# Patient Record
Sex: MALE | Race: ASIAN | NOT HISPANIC OR LATINO | URBAN - METROPOLITAN AREA
[De-identification: names, ages, dates, MRNs, and addresses within clinical notes are randomized per-mention and may not be internally consistent; named-entity substitution may affect disease eponyms.]

---

## 2022-12-12 ENCOUNTER — EMERGENCY (EMERGENCY)
Facility: HOSPITAL | Age: 62
LOS: 1 days | Discharge: HOME | End: 2022-12-12
Attending: EMERGENCY MEDICINE
Payer: COMMERCIAL

## 2022-12-12 VITALS
OXYGEN SATURATION: 97 % | HEART RATE: 71 BPM | SYSTOLIC BLOOD PRESSURE: 110 MMHG | DIASTOLIC BLOOD PRESSURE: 76 MMHG | RESPIRATION RATE: 18 BRPM | TEMPERATURE: 98 F

## 2022-12-12 VITALS
HEART RATE: 98 BPM | DIASTOLIC BLOOD PRESSURE: 84 MMHG | TEMPERATURE: 98 F | OXYGEN SATURATION: 97 % | SYSTOLIC BLOOD PRESSURE: 154 MMHG | WEIGHT: 188.94 LBS | RESPIRATION RATE: 20 BRPM | HEIGHT: 66.93 IN

## 2022-12-12 LAB
ALBUMIN SERPL ELPH-MCNC: 4.5 G/DL — SIGNIFICANT CHANGE UP (ref 3.3–5)
ALP SERPL-CCNC: 60 U/L — SIGNIFICANT CHANGE UP (ref 40–120)
ALT FLD-CCNC: 39 U/L — SIGNIFICANT CHANGE UP (ref 10–45)
ANION GAP SERPL CALC-SCNC: 14 MMOL/L — SIGNIFICANT CHANGE UP (ref 5–17)
AST SERPL-CCNC: 17 U/L — SIGNIFICANT CHANGE UP (ref 10–40)
BASOPHILS # BLD AUTO: 0.06 K/UL — SIGNIFICANT CHANGE UP (ref 0–0.2)
BASOPHILS NFR BLD AUTO: 0.7 % — SIGNIFICANT CHANGE UP (ref 0–2)
BILIRUB SERPL-MCNC: 0.3 MG/DL — SIGNIFICANT CHANGE UP (ref 0.2–1.2)
BUN SERPL-MCNC: 19 MG/DL — SIGNIFICANT CHANGE UP (ref 7–23)
CALCIUM SERPL-MCNC: 9.5 MG/DL — SIGNIFICANT CHANGE UP (ref 8.4–10.5)
CHLORIDE SERPL-SCNC: 104 MMOL/L — SIGNIFICANT CHANGE UP (ref 96–108)
CO2 SERPL-SCNC: 20 MMOL/L — LOW (ref 22–31)
CREAT SERPL-MCNC: 0.72 MG/DL — SIGNIFICANT CHANGE UP (ref 0.5–1.3)
EGFR: 103 ML/MIN/1.73M2 — SIGNIFICANT CHANGE UP
EOSINOPHIL # BLD AUTO: 0.47 K/UL — SIGNIFICANT CHANGE UP (ref 0–0.5)
EOSINOPHIL NFR BLD AUTO: 5.3 % — SIGNIFICANT CHANGE UP (ref 0–6)
GLUCOSE SERPL-MCNC: 117 MG/DL — HIGH (ref 70–99)
HCT VFR BLD CALC: 46.6 % — SIGNIFICANT CHANGE UP (ref 39–50)
HGB BLD-MCNC: 15.8 G/DL — SIGNIFICANT CHANGE UP (ref 13–17)
IMM GRANULOCYTES NFR BLD AUTO: 0.5 % — SIGNIFICANT CHANGE UP (ref 0–0.9)
LYMPHOCYTES # BLD AUTO: 2.03 K/UL — SIGNIFICANT CHANGE UP (ref 1–3.3)
LYMPHOCYTES # BLD AUTO: 22.9 % — SIGNIFICANT CHANGE UP (ref 13–44)
MCHC RBC-ENTMCNC: 32.4 PG — SIGNIFICANT CHANGE UP (ref 27–34)
MCHC RBC-ENTMCNC: 33.9 GM/DL — SIGNIFICANT CHANGE UP (ref 32–36)
MCV RBC AUTO: 95.7 FL — SIGNIFICANT CHANGE UP (ref 80–100)
MONOCYTES # BLD AUTO: 0.84 K/UL — SIGNIFICANT CHANGE UP (ref 0–0.9)
MONOCYTES NFR BLD AUTO: 9.5 % — SIGNIFICANT CHANGE UP (ref 2–14)
NEUTROPHILS # BLD AUTO: 5.44 K/UL — SIGNIFICANT CHANGE UP (ref 1.8–7.4)
NEUTROPHILS NFR BLD AUTO: 61.1 % — SIGNIFICANT CHANGE UP (ref 43–77)
NRBC # BLD: 0 /100 WBCS — SIGNIFICANT CHANGE UP (ref 0–0)
PLATELET # BLD AUTO: 193 K/UL — SIGNIFICANT CHANGE UP (ref 150–400)
POTASSIUM SERPL-MCNC: 4 MMOL/L — SIGNIFICANT CHANGE UP (ref 3.5–5.3)
POTASSIUM SERPL-SCNC: 4 MMOL/L — SIGNIFICANT CHANGE UP (ref 3.5–5.3)
PROT SERPL-MCNC: 6.8 G/DL — SIGNIFICANT CHANGE UP (ref 6–8.3)
RAPID RVP RESULT: SIGNIFICANT CHANGE UP
RBC # BLD: 4.87 M/UL — SIGNIFICANT CHANGE UP (ref 4.2–5.8)
RBC # FLD: 11.9 % — SIGNIFICANT CHANGE UP (ref 10.3–14.5)
SARS-COV-2 RNA SPEC QL NAA+PROBE: SIGNIFICANT CHANGE UP
SODIUM SERPL-SCNC: 138 MMOL/L — SIGNIFICANT CHANGE UP (ref 135–145)
TROPONIN T, HIGH SENSITIVITY RESULT: 8 NG/L — SIGNIFICANT CHANGE UP (ref 0–51)
WBC # BLD: 8.88 K/UL — SIGNIFICANT CHANGE UP (ref 3.8–10.5)
WBC # FLD AUTO: 8.88 K/UL — SIGNIFICANT CHANGE UP (ref 3.8–10.5)

## 2022-12-12 PROCEDURE — 36415 COLL VENOUS BLD VENIPUNCTURE: CPT

## 2022-12-12 PROCEDURE — 99285 EMERGENCY DEPT VISIT HI MDM: CPT

## 2022-12-12 PROCEDURE — 84484 ASSAY OF TROPONIN QUANT: CPT

## 2022-12-12 PROCEDURE — 71046 X-RAY EXAM CHEST 2 VIEWS: CPT

## 2022-12-12 PROCEDURE — 93005 ELECTROCARDIOGRAM TRACING: CPT

## 2022-12-12 PROCEDURE — 80053 COMPREHEN METABOLIC PANEL: CPT

## 2022-12-12 PROCEDURE — 85025 COMPLETE CBC W/AUTO DIFF WBC: CPT

## 2022-12-12 PROCEDURE — 0225U NFCT DS DNA&RNA 21 SARSCOV2: CPT

## 2022-12-12 PROCEDURE — 99285 EMERGENCY DEPT VISIT HI MDM: CPT | Mod: 25

## 2022-12-12 PROCEDURE — 94640 AIRWAY INHALATION TREATMENT: CPT

## 2022-12-12 PROCEDURE — 71046 X-RAY EXAM CHEST 2 VIEWS: CPT | Mod: 26

## 2022-12-12 RX ORDER — ALBUTEROL 90 UG/1
4 AEROSOL, METERED ORAL ONCE
Refills: 0 | Status: COMPLETED | OUTPATIENT
Start: 2022-12-12 | End: 2022-12-12

## 2022-12-12 RX ORDER — ALBUTEROL 90 UG/1
8 AEROSOL, METERED ORAL ONCE
Refills: 0 | Status: DISCONTINUED | OUTPATIENT
Start: 2022-12-12 | End: 2022-12-12

## 2022-12-12 RX ADMIN — ALBUTEROL 4 PUFF(S): 90 AEROSOL, METERED ORAL at 05:27

## 2022-12-12 NOTE — ED PROVIDER NOTE - NSFOLLOWUPINSTRUCTIONS_ED_ALL_ED_FT
You were seen in the ER for cough. Your lab results were within normal limits. Your CT scan showed ***.    Please follow up with your primary care doctor.    Please return to the ER if you have worsening symptoms including fever, chest pain, shortness of breath, abdominal pain, nausea, vomiting, diarrhea, weakness or lightheadedness/fainting. You were seen in the ER for cough. Your lab results were within normal limits. Your Xray was normal. You can take tylenol and/or motrin for your pain. Please follow the instructions on the packaging. You can use the albuterol inhaler if you have more symptoms.    Please follow up with your primary care doctor.    Please return to the ER if you have worsening symptoms including fever, chest pain, shortness of breath, abdominal pain, nausea, vomiting, diarrhea, weakness or lightheadedness/fainting.

## 2022-12-12 NOTE — ED PROVIDER NOTE - PHYSICAL EXAMINATION
General appearance: NAD, conversant, afebrile    Eyes: anicteric sclerae, CURLY, EOMI   HENT: Atraumatic; oropharynx clear, MMM and no ulcerations, no pharyngeal erythema or exudate   Neck: Trachea midline; Full range of motion, supple   Pulm: CTA bl, normal respiratory effort and no intercostal retractions, normal work of breathing   CV: RRR, No murmurs, rubs, or gallops.    Abdomen: Soft, non-tender, non-distended; no guarding or rebound   Extremities: No peripheral edema or extremity lymphadenopathy.    Skin: Dry, normal temperature, turgor and texture; no rash, ulcers or subcutaneous nodules   Psych: Appropriate affect, cooperative; alert and oriented to person, place and time

## 2022-12-12 NOTE — ED PROVIDER NOTE - CLINICAL SUMMARY MEDICAL DECISION MAKING FREE TEXT BOX
62-year-old male with history of CAD with 1 stent, current day smoker presenting with cough.  Patient started having symptoms of cough, congestion, fever, diarrhea 11/29, tested positive for COVID 12/2.  Symptoms of cough have persisted, causing chest pain and shortness of breath when he has coughing fits.  Tested negative for COVID at home today.  Patient is COVID vaccinated and flu vaccinated. Exam shows stable VS and clear lungs, +cough. Likely persistent cough from covid infection. Pt still smoking. Will get labs, CXR, advise to stop smoking.

## 2022-12-12 NOTE — ED ADULT NURSE NOTE - PAIN RATING/NUMBER SCALE (0-10): REST
----- Message from Brit Giang sent at 2/20/2017  9:26 AM EST -----  Concerning pts Ct scan--this went into clinical review with his insurance and they requested peer to peer by calling [phone]  How would you like to proceed with this referral?   4

## 2022-12-12 NOTE — ED PROVIDER NOTE - PATIENT PORTAL LINK FT
You can access the FollowMyHealth Patient Portal offered by [facility] by registering at the following website: [email or web address]. By joining [employer]’s FollowMyHealth portal, you will also be able to view your health information using other applications (apps) compatible with our system.

## 2022-12-12 NOTE — ED PROVIDER NOTE - OBJECTIVE STATEMENT
62-year-old male with history of CAD with 1 stent, current day smoker presenting with cough.  Patient started having symptoms of cough, congestion, fever, diarrhea 11/29, tested positive for COVID 12/2.  Symptoms of cough have persisted, causing chest pain and shortness of breath when he has coughing fits.  Tested negative for COVID at home today.  Patient is COVID vaccinated and flu vaccinated.  No abdominal pain, nausea, vomiting.  patient supposed to get spinal surgery for stenosis last week however was COVID-positive.  Patient has pain radiating down the right leg which is chronic and stable for him. No saddle anesthesia or incontinence.

## 2022-12-12 NOTE — ED PROVIDER NOTE - ATTENDING CONTRIBUTION TO CARE
Attending MD Perry:  I personally have seen and examined this patient. I have performed a substantive portion of the visit including all aspects of the medical decision making.  Resident note reviewed and agree on plan of care and except where noted.      62-year-old gentleman with a history of coronary artery disease, daily smoker presenting for evaluation of ongoing cough since 11/29.  Recent diagnosis of COVID-19.  He has pain when he is coughing.  There is some shortness of breath with coughing as well.    Vital signs within normal limits.  Patient sitting up in the stretcher in no apparent distress.  There is no increased work of breathing.  Lungs are clear posteriorly.  Abdomen soft nontender nondistended.  Extremities warm and well-perfused without appreciable edema.    Suspect likely ongoing post COVID-19 cough.  No clinical evidence to suggest superimposed bacterial pneumonia.  Will obtain chest x-ray, screening EKG blood work and troponin to rule out myocarditis or electrolyte derangements.  Trial bronchodilators for symptomatic relief of cough.        *The above represents an initial assessment/impression. Please refer to progress notes for potential changes in patient clinical course*

## 2022-12-12 NOTE — ED ADULT NURSE NOTE - OBJECTIVE STATEMENT
patient is a 61 y/o M, covid positive x3 weeks who presents to the ED c/o persistent cough. patient states that the cough is worse when lying flat, and causes him to have "aching" chest pain when he coughs. patient denies fevers, chest pain at rest at this time. current everyday smoker.  patient resting in NAD. respirations even and unlabored with symmetrical chest rise. abdomen soft and nondistended. a&ox4, ambulatory independently. MD Tilley at bedside to assess

## 2023-07-15 ENCOUNTER — EMERGENCY (EMERGENCY)
Facility: HOSPITAL | Age: 63
LOS: 1 days | Discharge: HOME | End: 2023-07-15
Attending: STUDENT IN AN ORGANIZED HEALTH CARE EDUCATION/TRAINING PROGRAM
Payer: COMMERCIAL

## 2023-07-15 VITALS
WEIGHT: 179.9 LBS | OXYGEN SATURATION: 95 % | TEMPERATURE: 98 F | RESPIRATION RATE: 16 BRPM | HEART RATE: 86 BPM | DIASTOLIC BLOOD PRESSURE: 89 MMHG | SYSTOLIC BLOOD PRESSURE: 144 MMHG

## 2023-07-15 VITALS
DIASTOLIC BLOOD PRESSURE: 67 MMHG | HEART RATE: 72 BPM | OXYGEN SATURATION: 98 % | TEMPERATURE: 98 F | SYSTOLIC BLOOD PRESSURE: 115 MMHG | RESPIRATION RATE: 18 BRPM

## 2023-07-15 PROCEDURE — 99284 EMERGENCY DEPT VISIT MOD MDM: CPT | Mod: 25

## 2023-07-15 PROCEDURE — 99284 EMERGENCY DEPT VISIT MOD MDM: CPT

## 2023-07-15 PROCEDURE — 72131 CT LUMBAR SPINE W/O DYE: CPT | Mod: MA

## 2023-07-15 PROCEDURE — 72131 CT LUMBAR SPINE W/O DYE: CPT | Mod: 26,MA

## 2023-07-15 PROCEDURE — 99053 MED SERV 10PM-8AM 24 HR FAC: CPT

## 2023-07-15 RX ORDER — IBUPROFEN 200 MG
400 TABLET ORAL ONCE
Refills: 0 | Status: COMPLETED | OUTPATIENT
Start: 2023-07-15 | End: 2023-07-15

## 2023-07-15 RX ORDER — METHOCARBAMOL 500 MG/1
1500 TABLET, FILM COATED ORAL ONCE
Refills: 0 | Status: COMPLETED | OUTPATIENT
Start: 2023-07-15 | End: 2023-07-15

## 2023-07-15 RX ORDER — ACETAMINOPHEN 500 MG
975 TABLET ORAL ONCE
Refills: 0 | Status: COMPLETED | OUTPATIENT
Start: 2023-07-15 | End: 2023-07-15

## 2023-07-15 RX ORDER — METHOCARBAMOL 500 MG/1
750 TABLET, FILM COATED ORAL ONCE
Refills: 0 | Status: COMPLETED | OUTPATIENT
Start: 2023-07-15 | End: 2023-07-15

## 2023-07-15 RX ORDER — LIDOCAINE 4 G/100G
1 CREAM TOPICAL ONCE
Refills: 0 | Status: COMPLETED | OUTPATIENT
Start: 2023-07-15 | End: 2023-07-15

## 2023-07-15 RX ORDER — LIDOCAINE 4 G/100G
1 CREAM TOPICAL
Qty: 2 | Refills: 0
Start: 2023-07-15 | End: 2023-07-24

## 2023-07-15 RX ORDER — METHOCARBAMOL 500 MG/1
2 TABLET, FILM COATED ORAL
Qty: 60 | Refills: 0
Start: 2023-07-15 | End: 2023-07-24

## 2023-07-15 RX ADMIN — Medication 975 MILLIGRAM(S): at 05:59

## 2023-07-15 RX ADMIN — Medication 400 MILLIGRAM(S): at 05:59

## 2023-07-15 RX ADMIN — METHOCARBAMOL 1500 MILLIGRAM(S): 500 TABLET, FILM COATED ORAL at 10:21

## 2023-07-15 RX ADMIN — LIDOCAINE 1 PATCH: 4 CREAM TOPICAL at 06:01

## 2023-07-15 RX ADMIN — METHOCARBAMOL 750 MILLIGRAM(S): 500 TABLET, FILM COATED ORAL at 05:59
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
## 2023-07-15 NOTE — ED PROVIDER NOTE - PHYSICAL EXAMINATION
GENERAL: Awake, alert, NAD  HEENT: NC/AT, moist mucous membranes, PERRL, EOMI  LUNGS: CTAB, no wheezes or crackles   CARDIAC: RRR, no m/r/g  ABDOMEN: Soft, non tender, non distended, no rebound, no guarding  BACK: Sacral midline spinal tenderness, right lumbosacral paraspinal tenderness.  Positive leg raise right> left.  EXT: No edema, no calf tenderness, 2+ DP pulses bilaterally, no deformities.  NEURO: A&Ox3. Moving all extremities.  Ranging all extremities.  SKIN: Warm and dry. No rash.  PSYCH: Normal affect.
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
## 2023-07-15 NOTE — ED PROVIDER NOTE - PROGRESS NOTE DETAILS
Dr. Mayorga Note: s/o from night team, pending ct lumbar, pt assessed, pain improved but now worsening again, will re-dose muscle relaxant. Schuyler Abreu, PGY2 - signed out from the previous team pending CT read. No acute fracture/dislocation at this time on the CT read. Patient reassessed. Pain is about the same however able to ambulate. Gave instruction to f/u with spine care.

## 2023-07-15 NOTE — ED ADULT NURSE REASSESSMENT NOTE - NS ED NURSE REASSESS COMMENT FT1
Received report from previous shift RN. Patient resting in bed in hallway, reporting improved back pain. Patient denies numbness/tingling/weakness. Patient and family aware of plan of care for CT. VSS

## 2023-07-15 NOTE — ED PROVIDER NOTE - NSFOLLOWUPINSTRUCTIONS_ED_ALL_ED_FT
No signs of emergency medical condition on today's workup.  Presumptive diagnosis made, but further evaluation may be required by your primary care doctor or specialist for a definitive diagnosis.  Therefore, follow up as directed and if symptoms change/worsen or any emergency conditions, please return to the ER.    YOU WERE SEEN FOR low back pain    YOU HAD imaging done. These results are included in your discharge paperwork.   You can take ibuprofen 400mg every 6 to 8 hours and Tylenol 1000mg every 6 to 8 hours as needed for pain.     FOLLOW UP WITH a spine care doctor     RETURN TO THE EMERGENCY DEPARTMENT FOR worsening back pain, losing bowel/bladder movement, fever/chills, loss of groin sensation or any new/concerning symptoms.

## 2023-07-15 NOTE — ED PROVIDER NOTE - PATIENT PORTAL LINK FT
You can access the FollowMyHealth Patient Portal offered by [facility] by registering at the following website: [email or web address]. By joining [employer]’s FollowMyHealth portal, you will also be able to view your health information using other applications (apps) compatible with our system.
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
## 2025-02-12 PROBLEM — I10 ESSENTIAL (PRIMARY) HYPERTENSION: Chronic | Status: ACTIVE | Noted: 2023-07-15

## 2025-03-17 NOTE — ED ADULT NURSE NOTE - NSFALLRSKASSESSDT_ED_ALL_ED
Cardiology Clinic Note: Andrei Avery DO     Primary care physician: Praneeth Duenas MD     Patient: Salbador Chowdhury  : 1957    Chief Complaint   Patient presents with    Office Visit     Denies CP,SOB and dizziness    Follow-up       History of Present Illness:     Salbador Chowdhury is a 67 year old male who presented today for a follow-up accompanied by his wife.  Patient is followed here for:   - CAD s/p CABG in , s/p KAREN x4 in RCA in ; s/p three stents placed in posterior lateral, distal and mid of RCA (2024).   - ICM  - HTN  - HLD  - KRISTIAN on CPAP       Since the patient's last visit with me, he is now s/p ICD implantation 25. The patient had a Device alert 3/5/25: alert for VT on 3/3 at 12:35 PM at 234 bpm lasting 21 seconds with successful ATPx1.     Otherwise, Regarding activity, the patient walks about 2.5 miles a day in addition to completing ROM cardiac rehab three times weekly. He reports that ROM cardiac rehab has been going well for him.    Currently He denies any chest pain, shortness of breath, or palpitations. Denies any orthopnea, PND, or peripheral edema. Denies any dizziness, lightheadedness, or syncope. He denies any bleeding complications such as unusual bruising, epistaxis, hematuria, hematochezia, melena. He denies any muscle aches or pains. The patient has been tolerating their medication regimen.    Patient's wife reports that they eat well balanced meals however the main concern regarding diet is the portion control.      Review of Systems   Constitutional: Negative.   HENT: Negative.     Eyes: Negative.    Cardiovascular:  Negative for chest pain, dyspnea on exertion, leg swelling, near-syncope, orthopnea, palpitations and syncope.   Respiratory: Negative.     Endocrine: Negative.    Hematologic/Lymphatic: Negative.    Skin: Negative.    Musculoskeletal: Negative.    Gastrointestinal: Negative.    Genitourinary: Negative.    Neurological: Negative.     Psychiatric/Behavioral: Negative.     Allergic/Immunologic: Negative.    All other systems reviewed and are negative.      Past Medical History:   Diagnosis Date    AAA (abdominal aortic aneurysm) (CMD)     Bronchitis     CAD (coronary artery disease)     HLD (hyperlipidemia)     HTN (hypertension)     Hx of CABG 2015    Ischemic cardiomyopathy     Left shoulder pain     Myocardial infarction  (CMD)     2015    Neck pain     KRISTIAN on CPAP     S/P right coronary artery (RCA) stent placement 2017    KAREN x4    Sore throat     Viral pharyngitis     VT (ventricular tachycardia)  (CMD)      Past Surgical History:   Procedure Laterality Date    Colonoscopy      Coronary angioplasty with stent placement  11/26/2024    RCA x 2, right PL branch    Coronary artery bypass graft  2015    x 3    Coronary stent placement  2016    RCA     ALLERGIES:   Allergen Reactions    Penicillins Other (See Comments)     Unknown     Social History     Tobacco Use    Smoking status: Former    Smokeless tobacco: Never   Vaping Use    Vaping status: never used   Substance Use Topics    Alcohol use: Yes     Comment: socially    Drug use: No      Family History   Problem Relation Age of Onset    Coronary Artery Disease Mother      Current Outpatient Medications   Medication Sig Dispense Refill    torsemide (DEMADEX) 10 MG tablet Take 0.5 tablets by mouth daily. 90 tablet 3    losartan (COZAAR) 25 MG tablet Take 1 tablet by mouth daily. 90 tablet 3    verapamil (CALAN SR) 240 MG CR tablet TAKE 1 TABLET BY MOUTH DAILY 90 tablet 3    metoPROLOL succinate (TOPROL-XL) 25 MG 24 hr tablet Take 1 tablet by mouth daily. 90 tablet 3    clopidogrel (PLAVIX) 75 MG tablet Take 1 tablet by mouth daily. 90 tablet 3    rosuvastatin (CRESTOR) 20 MG tablet Take 0.5 tablets by mouth at bedtime. 45 tablet 1    aspirin 81 MG tablet Take 81 mg by mouth daily.       Multiple Vitamin (MULTI-VITAMIN) tablet Take 1 tablet by mouth daily.        No current  facility-administered medications for this visit.          Vitals:    03/18/25 0955 03/18/25 1000   BP: 127/77 121/72   BP Location: LUE - Left upper extremity LUE - Left upper extremity   Patient Position: Sitting Standing   Cuff Size: Regular Large Adult   Pulse: 68    Temp: 97.8 °F (36.6 °C)    TempSrc: Temporal    SpO2: 96%    Weight: 115.3 kg (254 lb 3.1 oz)    Height: 5' 11\" (1.803 m)            Wt Readings from Last 4 Encounters:   03/18/25 115.3 kg (254 lb 3.1 oz)   01/27/25 112.9 kg (249 lb)   01/10/25 116.4 kg (256 lb 9.9 oz)   12/17/24 116.2 kg (256 lb 1 oz)       Physical Exam   Constitutional: He appears healthy. No distress.   Neck: No JVD present. No thyromegaly present.   Cardiovascular: Normal rate, regular rhythm, S1 normal, S2 normal, normal heart sounds, intact distal pulses and normal pulses. Exam reveals no S3 and no S4.   Pulmonary/Chest: Breath sounds normal. He has no wheezes. He has no rales. He exhibits no tenderness.   Abdominal: Soft. He exhibits no distension and no mass. There is no abdominal tenderness.   Musculoskeletal:         General: No edema. Normal range of motion.   Neurological: He is alert and oriented to person, place, and time.   Skin: Skin is warm and dry.        Labs:    Lipid Panel 11/15/2023:    Cholesterol  <=199 mg/dL 105 102 CM 95 106 R, CM 99 R,  R,  R, CM    Comment:  Desirable         <200  Borderline High   200 to 239  High              >=240   Triglycerides  <=149 mg/dL 95 99  112 R, CM 80 R,  R, CM 88 R, CM    Comment:  Normal            <150  Borderline High   150 to 199  High              200 to 499  Very High         >=500   HDL  >=40 mg/dL 41 41 CM 41 39 Low  R, CM 39 Low  R, CM 38 Low  R, CM 36 Low  R, CM    Comment:  Low              <40  Borderline Low   40 to 49  Near Optimal     50 to 59  Optimal          >=60   LDL  <=129 mg/dL 45 41 CM 31 CM 45 R, CM 44 R, CM 39 R, CM 47 R, CM 47 R, CM   Comment:  Optimal           <100  Near  Optimal      100 to 129  Borderline High   130 to 159  High              160 to 189  Very High         >=190       Recent Labs   Lab 01/22/25  0929 12/09/24  1014 11/12/24  0901 09/30/24  0946   Sodium 136 138  --  144   Chloride 103 103  --  112*   BUN 21* 22*  --  17   Potassium 4.6 4.0 4.6 5.4*   Glucose 98 87  --  101*   Creatinine 1.00 1.05  --  0.90   Calcium 9.2 8.8  --  9.1   TSH  --   --   --  1.236       Recent Labs   Lab 01/22/25  0929   WBC 7.6   RBC 5.75   HGB 14.4   HCT 44.3   MCV 77.0*   MCHC 32.5   RDW-CV 19.0*      Lymphocytes, Percent 24         Recent Labs   Lab 12/09/24  1014 09/30/24  0946   GPT 18 22      Imaging:    Cardiac cath 12/21/2015:  CONCLUSION:   1. Left main coronary artery with 80% eccentric ulcerated lesion in the distal       left main.   2. Left anterior descending artery with mild diffuse disease without discrete       lesions.   3. Ramus intermedius artery which bifurcates into 2 vessels with mild disease,       probably 50% in the proximal branching point.   4. Left circumflex artery with no significant disease.   5. Right coronary artery with significant diffuse disease throughout the course,       99% stenosis proximal RCA at the takeoff of the 1st acute marginal, followed by       90% at the distal RCA at the takeoff of PDA and posterolateral branch.   6. Left ventricular end-diastolic pressure approximately 10 mmHg.   7. Successful hemostasis achieved with radial Vasc band patent hemostasis device.      RECOMMENDATIONS:   After careful review of angiographic findings, the decision was made to refer the   patient for coronary artery bypass surgery.  The patient will be monitored in the                                Cardiac Cath - 2   intensive care unit.  Heparin drip with bolus will be started, nitro drip will be   started, Plavix will be on hold, and aspirin and beta-blockers will be continued.      TTE 12/3/17:  STUDY CONCLUSIONS  SUMMARY:  1. Left ventricle:  There is basal inferior hypokinesis. The cavity size is     dilated. Wall thickness is normal. Systolic function is normal. The     estimated ejection fraction is 55-60%. Left ventricular diastolic     function parameters are normal.  2. Left atrium: The atrium is moderately dilated.  3. Right atrium: The atrium is moderately dilated.  4. Mitral valve: Trivial regurgitation.  5. Tricuspid valve: Trivial regurgitation.  6. Right ventricle: The estimated peak pressure is 39mm Hg.  7. Pulmonic valve: Mild regurgitation.     Cardiac Cath 12/8/17:  CONCLUSIONS:  Complex intervention of a heavily calcified RCA with a critical posterolateral  stenosis, critical distal RCA, mid RCA stenosis with calcification, and moderate  atherosclerosis of the proximal RCA.  Four stents in total placed all drug-eluting.  RUTH ANN-3 flow restored to the distal right and posterolateral system.  There are  widely patent vein graft to the PDA, LAD, and ramus intermedius.  Next, the patient  will be strongly encouraged participating in cardiac rehab.  Perclose was used to  seal the femoral arteriotomy.     Stress Test 10/15/18:  FINAL IMPRESSION:  There were no symptoms consistent with angina pectoris.   There were no electrocardiographic changes diagnostic of myocardial ischemia.   Bigeminal PVCs were noted during recovery .   There was a normal blood pressure response to exercise.   The patient was functional class I. Achieved 10.6 M ETS. Sensitivity of the test is reduced due to inability to reach 85% of the target heart rate.     TTE 12/8/2021:  STUDY CONCLUSIONS  SUMMARY:  1. Procedure narrative: Transthoracic echocardiography was performed.     Image quality was suboptimal. Intravenous contrast (Definity) was     administered to opacify the left ventricle.  2. Left ventricle: The cavity size is at the upper limits of normal. Wall     thickness is mildly increased. The ejection fraction was measured by     visual estimation. Doppler  parameters are consistent with abnormal left     ventricular relaxation (grade 1 diastolic dysfunction). The ejection     fraction is 55%.  3. Regional wall motion abnormalities: Possible of the basal inferoseptal     and basal inferior myocardium.  4. Aortic valve: There is no stenosis. No significant regurgitation.  5. Ascending aorta: The ascending aorta is mildly increased in size. 39mm  6. Left atrium: The atrium is mildly dilated.  7. In comparison to 2017 TTE, suspect possible new RWMA.     Stress test 2/9/2022:  Conclusions  1.  Fair exercise capacity attaining 85% of age-predicted maximum  2.  No evidence of exercise-induced ischemic symptoms or EKG changes  3.  Nonsustained ventricular ectopy both monomorphic and polymorphic but less than prior stress test.  4.  Normal blood pressure response to exercise    TTE 3/31/2023:  1. Left ventricle: The cavity size is normal. Wall thickness is mildly     increased. Systolic function is normal. Systolic function is unchanged     since the study of 12/08/2021. The ejection fraction was measured by     biplane method of disks. The ejection fraction is 64%.  2. Regional wall motion: There is hypokinesis of the basal inferior and mid     inferolateral walls.  3. Ventricular septum: Thickness is mildly increased.  4. Ascending aorta: The vessel is mildly dilated.  5. Mitral valve: There is mild regurgitation.  6. Right ventricle: The cavity size is normal. Systolic function is normal.  7. Pulmonic valve: There is mild regurgitation.    Stress test 3/31/2023:  Conclusions  1.  Fair exercise capacity attaining 7 METS and target heart rate  2.  No evidence of exercise-induced ischemic chest symptoms  3.  Abnormal resting EKG precludes comments about exercise-induced changes  4.  Ventricular ectopy that increased at peak exertion then resolved in recovery.  Polymorphic ventricular bigeminy and short 3 beat runs of tachycardia seen.  5.  Normal blood pressure response to  exercise    Impression:  Abnormal myocardial perfusion with a defect in the basal to mid inferior wall. This was present on the prior study and could represent diaphragmatic attenuation artifact, however mild ischemia cannot be ruled out.    EKG portion to be dictated separately.    US Aorta Screening AAA 08/28/2023:    IMPRESSION:  Distal abdominal aortic aneurysm measuring up to 4 cm.    ECG performed on 06/28/2024 and personally reviewed - SR, first degree AV block, single AVC, 73 BPM.    Angiogram at [place] 11/2024  Postop diagnosis:    1.  Significant progression in native coronary artery disease.    2.  His bypass grafts are LIMA to LAD, SVG to ramus intermedius and SVG to right PDA.    3.  LV pressures were 116 with a EDP of 14.  No gradient across the aortic valve.  Please refer to uploaded hemodynamic data sheet for additional details.    4.  Left main trunk is of normal caliber and has 70% distal stenosis prior to its trifurcation into LAD, ramus and circumflex arteries.    5.  Circumflex artery is smaller than usual in caliber and provides small 3 obtuse marginal branches.  No significant stenosis in circumflex system.    6.  Ramus intermedius is of good caliber and bifurcates into a superior and inferior subdivision.  Both divisions are of good caliber.  The inferior subdivision receives the SVG to ramus intermedius and the graft is patent.    7.  Right coronary artery has multiple stents in the proximal, mid and distal segment.  It continues into right posterolateral branch which also has stents with 90% restenosis.  The proximal segment and distal segment of the RCA also has 75% in-stent restenosis.  Native PDA is occluded but receives SVG to right PDA which is patent.  The PDA has mild proximal disease.  8.  The right coronary artery was successfully stented.  RPL branch received 2.25 x 8 mm drug-eluting Xience stent, distal RCA received 3.0 x 12 mm drug-eluting Dru frontier stent and proximal  RCA received 3.5 x 8 mm drug-eluting Dru frontier stent.  The proximal stent was postdilated with a 3.75 x 8 mm noncompliant balloon at 20 atmospheric pressures.  Final result was 0% residual at all 3 locations, smooth lumen and maintaining RUTH ANN-3 flow.     Echocardiogram 11/25/2024Summary:    1. The left ventricular size is mildly dilated, the overall systolic function is mildly decreased    and mild left ventricular hypertrophy is present.    2. Pseudonormal (grade II) LV diastolic filling pattern with normal left ventricular filling    pressure.    3. Biplane LV EF = 46 %.    4. Abnormal septal motion consistent with post-operative status.    5. The right ventricular size is normal and the right ventricular global systolic function is    normal.    6. Left atrial size is severely dilated.    7. The mitral valve is mildly thickened. There is mild mitral valve regurgitation.    8. The aortic valve is probably tricuspid and the aortic valve is mildly sclerotic without    stenosis.    9. Absence of sufficient tricuspid regurgitation precludes estimation of pulmonary artery    systolic pressure.    10. The pulmonic valve is normal in structure and mild pulmonic regurgitation is present.     Abdomen CT 10/14/24:  IMPRESSION:     1. 5 x 4.2 cm infrarenal abdominal aortic aneurysm     2. Cholelithiasis     Cardiac Monitor 12/03/24:  Findings:     The predominant rhythm was sinus bradycardia.  Atrial fibrillation / atrial flutter 22% burden, with controlled or slow ventricular response.  Both AF and AFL were observed on rhythm strips.  There were 23 pauses >3 seconds, longest 4.3 seconds.  No periods of high grade AV block observed.   There were no other sustained supraventricular arrhythmias.  There was <1% supraventricular ectopy.   There was 2% ventricular ectopy. 2 episodes of non-sustained ventricular tachycardia, longest 6 seconds.  There was 1 patient triggered event reportedly correlating with sinus rhythm with  1st degree AV block / lead loss, but this rhythm strip is not provided for personal review.    Cardiac device check 2/3/25  Interpretation:  Initial OV s/p implant. Incision approximated. No redness, warmth, swelling, or drainage. Reviewed S&S of infection, pt instructed to call office if symptoms of infection occur. Reviewed restrictions of affected arm.  Battery longevity: >10 years  Sensing: stable  Impedance: stable  Threshold: stable  AP: 78%  : 39%  PVC burden: 14.8k since 1/27/25  HVR: none  AF burden: 0%  Advisory: none  Device functioning within normal limits. No changes required    Cardiac device check alert 3/5/25   Interpretation:     Alert for ATP delivered for VT on 3/3  VT at 12:35pm @ 234bpm lasting 21 seconds with successful ATPx1  LVM for pt to callback to discuss any symptoms  Message to Dr. Avery's pool    Assessment/Plan:    1.Recurrent syncope  2.S/P ICD implant 1/16/25  - During stress test on 12/8/2021 developed monomorphic ventricular tachycardia at a rate of 150 bpm, asymptomatic. Repeat stress test on 2/9/2022 showed ventricular ectopy both monomorphic and polymorphic but less than prior stress test. Recent stress test on 3/31/2023 showed ventricular ectopy that increased at peak exertion then resolved in recovery.  - Cardiac MRI noted low-normal EF and subendocardial delayed enhanvement in basal to mid inferior wall and basal inferoseptal wall, likely due to prior MI.   - Cardiac monitor results documented paroxysmal atrial fibrillation 22% of the time with significant bradycardia with up to 4.3-second pauses in the daytime hours including nonsustained ventricular tachycardia over 20-30 beats.  Combined with the patient's symptoms of recurrent syncope, and ischemic cardiomyopathy, documented tacky and bradycardia arrhythmias patient is now s/p ICD implant on 1/16/25.  -Device check 2/3/25 battery longevity >10 years, AP 78%,  39% AF burden 0%.  -Device alert 3/5/25: alert for VT on  3/3 at 12:35 PM at 234 bpm lasting 21 seconds with successful ATPx1.  - Will continue medical management with metoprolol and verapamil.   - Encouraged patient to continue vigorous walks and monitor his heart rhythm using Apple watch EKG.  - Patient's device recently alerted of VT but it was treated by his device. He is asymptomatic currently and inquires about his driving ability. Instructed the patient to limit driving to daytime for now. If there are other signals, we will continue to restrict driving accordingly. Advised to avoid nighttime driving and if he feels even slightly unwell, do not drive.     2.Recent NSTEMI  3. Ischemic cardiomyopathy  - Patient compensated on exam and is recovering his functional capacity.   -Echo completed on 11/25/24 showed reduced LVEF of 46%  - I explained that his syncope likely may be due to ventricular tachycardia however, telemetry strip recordings from paramedics were reviewed and VT was not documented. Patient and wife reports that the patient was not \"shocked\" by the paramedics at the time.  -MCT monitor was placed on the patient on last visit to monitor his arrhythmias. Results are currently pending and not on file. There was one alert on Dec 16th however reason was unknown. I will inquire about the results.  -continue clopidogrel 75 mg qd and aspirin 81 mg qd, beta-blocker and statin      4. Coronary artery disease- CAD s/p CABG in 2015, s/p KAREN x4 in RCA in 2017, s/p three stents placed in posterior lateral, distal and mid of RCA (11/2024 with [place])   - Nuclear stress test in March 2023 showed abnormal myocardial perfusion, which is unchanged from the previous study. No new abnormalities were noted.  - Normal echocardiogram from March 2023 with LVEF 64%. However post recent NSTEMI Echo of 11/24/24 showed reduced LVEF of 46%.   -Patient will be enrolled into at-home cardiac rehab and has been walking in addition. Reports to be doing well.  - Will continue  clopidogrel 75 mg qd and aspirin 81 mg qd  - Will continue statin and beta blocker for risk reduction.  -CRP was 1.31 as of 1/3/24.  - Recommended the following lifestyle considerations:  -- Limit your intake of processed foods, exercise regularly, and if you smoke, quit.   -- Eat foods rich in anti-oxidants and high in fiber, and consider a heart healthy Mediterranean-style diet.   -- Limit foods high in sugar and salt (sodium) to reduce the damage to your endothelium (vessel lining).  -- Strive for at least 150 minutes/week of accumulated moderate intensity or 75 minutes/week of vigorous intensity aerobic physical activity.   -- Strive for optimal oral care to reduce inflammation associated with periodontal disease.     5. Hypertension  - Blood pressure is controlled in the office today   - Will continue metoprolol succinate 25 mg daily, losartan 25 mg qd, and verapamil 240 mg daily. Patient should split metoprolol and losartan from verapamil.   -. Patient's weight has been stable around 250 lbs.  -Will repeat CMP to monitor potassium levels. May reduce torsemide by half tablet or QOD if he is unable to cut it in half.    6. Hyperlipidemia  - LDL 37 as of 12/924  - LDL is at goal.  -Lp(a) 19 from 12/9/24  - Continue rosuvastatin 10 mg daily. Myalgia potentially due to statin  - Continue rosuvastatin 10 mg daily to reduce full body aches.  - Consider injectable therapies for cholesterol if symptoms persist.      F/U 6 MONTHS    On 03/18/25, Justyn AKERS scribed the services personally performed by Andrei Avery DO.     The documentation recorded by the scribe accurately and completely reflects the service(s) Andrei AKERS DO,  personally performed and the decisions made by me.       Andrei Avery DO  Interventional Cardiology  Please contact via Epic or Unype   12-Dec-2022 05:31

## 2025-05-16 ENCOUNTER — APPOINTMENT (OUTPATIENT)
Age: 65
End: 2025-05-16

## 2025-05-16 VITALS
OXYGEN SATURATION: 94 % | DIASTOLIC BLOOD PRESSURE: 70 MMHG | HEART RATE: 73 BPM | TEMPERATURE: 97.3 F | HEIGHT: 68 IN | SYSTOLIC BLOOD PRESSURE: 130 MMHG | BODY MASS INDEX: 29.25 KG/M2 | WEIGHT: 193 LBS

## 2025-05-16 DIAGNOSIS — N40.1 BENIGN PROSTATIC HYPERPLASIA WITH LOWER URINARY TRACT SYMPMS: ICD-10-CM

## 2025-05-16 DIAGNOSIS — Z87.891 PERSONAL HISTORY OF NICOTINE DEPENDENCE: ICD-10-CM

## 2025-05-16 DIAGNOSIS — N13.8 BENIGN PROSTATIC HYPERPLASIA WITH LOWER URINARY TRACT SYMPMS: ICD-10-CM

## 2025-05-16 DIAGNOSIS — Z80.0 FAMILY HISTORY OF MALIGNANT NEOPLASM OF DIGESTIVE ORGANS: ICD-10-CM

## 2025-05-16 PROBLEM — Z00.00 ENCOUNTER FOR PREVENTIVE HEALTH EXAMINATION: Status: ACTIVE | Noted: 2025-05-16

## 2025-05-16 PROCEDURE — 99204 OFFICE O/P NEW MOD 45 MIN: CPT

## 2025-05-16 RX ORDER — VIBEGRON 75 MG/1
75 TABLET, FILM COATED ORAL
Refills: 0 | Status: ACTIVE | COMMUNITY

## 2025-05-16 RX ORDER — ROSUVASTATIN CALCIUM 20 MG/1
20 TABLET, FILM COATED ORAL
Refills: 0 | Status: ACTIVE | COMMUNITY

## 2025-05-16 RX ORDER — VALSARTAN AND HYDROCHLOROTHIAZIDE 160; 12.5 MG/1; MG/1
160-12.5 TABLET, FILM COATED ORAL
Refills: 0 | Status: ACTIVE | COMMUNITY

## 2025-05-16 RX ORDER — MUPIROCIN 20 MG/G
2 OINTMENT TOPICAL
Refills: 0 | Status: ACTIVE | COMMUNITY

## 2025-05-16 RX ORDER — CLOPIDOGREL BISULFATE 75 MG/1
75 TABLET, FILM COATED ORAL
Refills: 0 | Status: ACTIVE | COMMUNITY

## 2025-05-16 RX ORDER — BUPROPION HYDROCHLORIDE 75 MG/1
TABLET, FILM COATED ORAL
Refills: 0 | Status: ACTIVE | COMMUNITY

## 2025-05-16 RX ORDER — SODIUM CHLORIDE 0.65 %
0.65 AEROSOL, SPRAY (ML) NASAL
Refills: 0 | Status: ACTIVE | COMMUNITY

## 2025-05-16 RX ORDER — HYDROCORTISONE 25 MG/G
2.5 CREAM TOPICAL
Refills: 0 | Status: ACTIVE | COMMUNITY

## 2025-05-16 RX ORDER — CLOSTRIDIUM TETANI TOXOID ANTIGEN (FORMALDEHYDE INACTIVATED), CORYNEBACTERIUM DIPHTHERIAE TOXOID ANTIGEN (FORMALDEHYDE INACTIVATED), BORDETELLA PERTUSSIS TOXOID ANTIGEN (GLUTARALDEHYDE INACTIVATED), BORDETELLA PERTUSSIS FILAMENTOUS HEMAGGLUTININ ANTIGEN (FORMALDEHYDE INACTIVATED), BORDETELLA PERTUSSIS PERTACTIN ANTIGEN, AND BORDETELLA PERTUSSIS FIMBRIAE 2/3 ANTIGEN 5; 2; 2.5; 5; 3; 5 [LF]/.5ML; [LF]/.5ML; UG/.5ML; UG/.5ML; UG/.5ML; UG/.5ML
INJECTION, SUSPENSION INTRAMUSCULAR
Refills: 0 | Status: ACTIVE | COMMUNITY

## 2025-05-16 RX ORDER — OMEPRAZOLE 40 MG/1
40 CAPSULE, DELAYED RELEASE ORAL
Refills: 0 | Status: ACTIVE | COMMUNITY

## 2025-05-16 RX ORDER — FINASTERIDE 5 MG/1
5 TABLET, FILM COATED ORAL
Qty: 90 | Refills: 3 | Status: ACTIVE | COMMUNITY
Start: 2025-05-16 | End: 1900-01-01

## 2025-05-16 RX ORDER — CARVEDILOL 6.25 MG/1
6.25 TABLET, FILM COATED ORAL
Refills: 0 | Status: ACTIVE | COMMUNITY

## 2025-05-16 RX ORDER — OXYBUTYNIN CHLORIDE 5 MG/1
5 TABLET ORAL
Refills: 0 | Status: ACTIVE | COMMUNITY

## 2025-05-16 RX ORDER — FINASTERIDE 5 MG/1
5 TABLET, FILM COATED ORAL
Refills: 0 | Status: ACTIVE | COMMUNITY

## 2025-05-16 RX ORDER — GUAIFENESIN 600 MG
600 TABLET, EXTENDED RELEASE ORAL
Refills: 0 | Status: ACTIVE | COMMUNITY

## 2025-05-16 RX ORDER — AMOXICILLIN 500 MG/1
500 CAPSULE ORAL
Refills: 0 | Status: ACTIVE | COMMUNITY

## 2025-05-16 RX ORDER — ZOLPIDEM TARTRATE 5 MG/1
5 TABLET ORAL
Refills: 0 | Status: ACTIVE | COMMUNITY

## 2025-05-16 RX ORDER — FLUTICASONE PROPIONATE 50 UG/1
50 SPRAY, METERED NASAL
Refills: 0 | Status: ACTIVE | COMMUNITY

## 2025-05-16 RX ORDER — BACITRACIN 500 [USP'U]/G
500 OINTMENT OPHTHALMIC
Refills: 0 | Status: ACTIVE | COMMUNITY

## 2025-05-16 RX ORDER — SERTRALINE HYDROCHLORIDE 50 MG/1
50 TABLET, FILM COATED ORAL
Refills: 0 | Status: ACTIVE | COMMUNITY

## 2025-05-16 RX ORDER — BACLOFEN 10 MG/1
10 TABLET ORAL
Refills: 0 | Status: ACTIVE | COMMUNITY

## 2025-05-16 RX ORDER — IBUPROFEN 600 MG/1
600 TABLET, FILM COATED ORAL
Refills: 0 | Status: ACTIVE | COMMUNITY

## 2025-05-16 RX ORDER — PIOGLITAZONE HYDROCHLORIDE 15 MG/1
15 TABLET ORAL
Refills: 0 | Status: ACTIVE | COMMUNITY

## 2025-05-17 LAB
APPEARANCE: CLEAR
BACTERIA: NEGATIVE /HPF
BILIRUBIN URINE: NEGATIVE
BLOOD URINE: NEGATIVE
CAST: 0 /LPF
COLOR: YELLOW
EPITHELIAL CELLS: 1 /HPF
GLUCOSE QUALITATIVE U: NEGATIVE MG/DL
KETONES URINE: NEGATIVE MG/DL
LEUKOCYTE ESTERASE URINE: NEGATIVE
MICROSCOPIC-UA: NORMAL
NITRITE URINE: NEGATIVE
PH URINE: 6
PROTEIN URINE: NEGATIVE MG/DL
RED BLOOD CELLS URINE: 1 /HPF
SPECIFIC GRAVITY URINE: 1.02
UROBILINOGEN URINE: 0.2 MG/DL
WHITE BLOOD CELLS URINE: 0 /HPF

## 2025-05-18 LAB — BACTERIA UR CULT: NORMAL

## 2025-05-26 PROBLEM — R39.9 LOWER URINARY TRACT SYMPTOMS (LUTS): Status: ACTIVE | Noted: 2025-05-26

## 2025-05-26 PROBLEM — Z86.79 HISTORY OF CORONARY ARTERY DISEASE: Status: RESOLVED | Noted: 2025-05-26 | Resolved: 2025-05-26

## 2025-05-26 PROBLEM — Z86.39 HISTORY OF HYPERLIPIDEMIA: Status: RESOLVED | Noted: 2025-05-26 | Resolved: 2025-05-26

## 2025-05-26 PROBLEM — Z86.39 HISTORY OF TYPE 2 DIABETES MELLITUS: Status: RESOLVED | Noted: 2025-05-26 | Resolved: 2025-05-26

## 2025-05-26 PROBLEM — Z86.79 HISTORY OF HYPERTENSION: Status: RESOLVED | Noted: 2025-05-26 | Resolved: 2025-05-26

## 2025-05-29 ENCOUNTER — APPOINTMENT (OUTPATIENT)
Dept: UROLOGY | Facility: CLINIC | Age: 65
End: 2025-05-29
Payer: MEDICARE

## 2025-05-29 DIAGNOSIS — Z87.891 PERSONAL HISTORY OF NICOTINE DEPENDENCE: ICD-10-CM

## 2025-05-29 DIAGNOSIS — N13.8 BENIGN PROSTATIC HYPERPLASIA WITH LOWER URINARY TRACT SYMPMS: ICD-10-CM

## 2025-05-29 DIAGNOSIS — N40.1 BENIGN PROSTATIC HYPERPLASIA WITH LOWER URINARY TRACT SYMPMS: ICD-10-CM

## 2025-05-29 PROCEDURE — G2211 COMPLEX E/M VISIT ADD ON: CPT

## 2025-05-29 PROCEDURE — 99214 OFFICE O/P EST MOD 30 MIN: CPT

## 2025-05-29 RX ORDER — BEMPEDOIC ACID AND EZETIMIBE 180; 10 MG/1; MG/1
180-10 TABLET, FILM COATED ORAL
Refills: 0 | Status: ACTIVE | COMMUNITY

## 2025-05-29 RX ORDER — METFORMIN HYDROCHLORIDE 500 MG/1
500 TABLET, COATED ORAL
Refills: 0 | Status: ACTIVE | COMMUNITY

## 2025-06-12 ENCOUNTER — APPOINTMENT (OUTPATIENT)
Dept: UROLOGY | Facility: CLINIC | Age: 65
End: 2025-06-12

## 2025-06-12 VITALS
BODY MASS INDEX: 30.29 KG/M2 | DIASTOLIC BLOOD PRESSURE: 64 MMHG | RESPIRATION RATE: 16 BRPM | HEIGHT: 67 IN | SYSTOLIC BLOOD PRESSURE: 104 MMHG | WEIGHT: 193 LBS | TEMPERATURE: 97.7 F | HEART RATE: 73 BPM | OXYGEN SATURATION: 96 %

## 2025-06-12 PROCEDURE — 51798 US URINE CAPACITY MEASURE: CPT | Mod: 59

## 2025-06-12 PROCEDURE — 51741 ELECTRO-UROFLOWMETRY FIRST: CPT

## 2025-06-12 PROCEDURE — 76872 US TRANSRECTAL: CPT

## 2025-06-12 PROCEDURE — 52000 CYSTOURETHROSCOPY: CPT

## 2025-07-01 ENCOUNTER — APPOINTMENT (OUTPATIENT)
Dept: INTERNAL MEDICINE | Facility: CLINIC | Age: 65
End: 2025-07-01
Payer: MEDICARE

## 2025-07-01 VITALS
DIASTOLIC BLOOD PRESSURE: 72 MMHG | HEIGHT: 67 IN | HEART RATE: 89 BPM | WEIGHT: 194 LBS | OXYGEN SATURATION: 98 % | BODY MASS INDEX: 30.45 KG/M2 | SYSTOLIC BLOOD PRESSURE: 130 MMHG

## 2025-07-01 PROBLEM — Z80.0 FAMILY HISTORY OF COLON CANCER: Status: ACTIVE | Noted: 2025-07-01

## 2025-07-01 PROBLEM — I73.9 PVD (PERIPHERAL VASCULAR DISEASE): Status: ACTIVE | Noted: 2025-07-01

## 2025-07-01 PROBLEM — Z80.0 FAMILY HISTORY OF MALIGNANT NEOPLASM OF STOMACH: Status: ACTIVE | Noted: 2025-07-01

## 2025-07-01 PROBLEM — E55.9 VITAMIN D DEFICIENCY: Status: ACTIVE | Noted: 2025-07-01

## 2025-07-01 PROBLEM — R53.83 FATIGUE: Status: ACTIVE | Noted: 2025-07-01

## 2025-07-01 PROBLEM — K21.9 ESOPHAGEAL REFLUX: Status: ACTIVE | Noted: 2025-07-01

## 2025-07-01 PROBLEM — F41.9 ANXIETY: Status: ACTIVE | Noted: 2025-07-01

## 2025-07-01 PROCEDURE — 99205 OFFICE O/P NEW HI 60 MIN: CPT

## 2025-07-01 PROCEDURE — G2211 COMPLEX E/M VISIT ADD ON: CPT

## 2025-07-01 PROCEDURE — 36415 COLL VENOUS BLD VENIPUNCTURE: CPT

## 2025-07-01 RX ORDER — ROSUVASTATIN CALCIUM 20 MG/1
20 TABLET, FILM COATED ORAL
Qty: 90 | Refills: 2 | Status: ACTIVE | COMMUNITY
Start: 2025-07-01

## 2025-07-01 RX ORDER — OMEPRAZOLE 40 MG/1
40 CAPSULE, DELAYED RELEASE ORAL
Qty: 90 | Refills: 3 | Status: ACTIVE | COMMUNITY
Start: 2025-07-01

## 2025-07-01 RX ORDER — VIBEGRON 75 MG/1
75 TABLET, FILM COATED ORAL
Qty: 90 | Refills: 0 | Status: ACTIVE | COMMUNITY
Start: 2025-07-01

## 2025-07-01 RX ORDER — ASPIRIN 81 MG/1
81 TABLET, DELAYED RELEASE ORAL
Qty: 90 | Refills: 3 | Status: ACTIVE | COMMUNITY
Start: 2025-07-01

## 2025-07-02 LAB
25(OH)D3 SERPL-MCNC: 30.7 NG/ML
ALBUMIN SERPL ELPH-MCNC: 4.6 G/DL
ALP BLD-CCNC: 47 U/L
ALT SERPL-CCNC: 53 U/L
AMYLASE/CREAT SERPL: 64 U/L
ANION GAP SERPL CALC-SCNC: 15 MMOL/L
AST SERPL-CCNC: 27 U/L
BILIRUB SERPL-MCNC: 0.6 MG/DL
BUN SERPL-MCNC: 13 MG/DL
CALCIUM SERPL-MCNC: 9.6 MG/DL
CANCER AG19-9 SERPL-ACNC: <2 U/ML
CHLORIDE SERPL-SCNC: 107 MMOL/L
CHOLEST SERPL-MCNC: 146 MG/DL
CO2 SERPL-SCNC: 21 MMOL/L
CREAT SERPL-MCNC: 0.85 MG/DL
EGFRCR SERPLBLD CKD-EPI 2021: 96 ML/MIN/1.73M2
ESTIMATED AVERAGE GLUCOSE: 128 MG/DL
FOLATE SERPL-MCNC: 6.5 NG/ML
GLUCOSE SERPL-MCNC: 105 MG/DL
HBA1C MFR BLD HPLC: 6.1 %
HCT VFR BLD CALC: 41.5 %
HDLC SERPL-MCNC: 38 MG/DL
HGB BLD-MCNC: 13.9 G/DL
IRON SATN MFR SERPL: 29 %
IRON SERPL-MCNC: 99 UG/DL
LDLC SERPL-MCNC: 76 MG/DL
LPL SERPL-CCNC: 20 U/L
MCHC RBC-ENTMCNC: 32.7 PG
MCHC RBC-ENTMCNC: 33.5 G/DL
MCV RBC AUTO: 97.6 FL
NONHDLC SERPL-MCNC: 108 MG/DL
PLATELET # BLD AUTO: 208 K/UL
POTASSIUM SERPL-SCNC: 4.1 MMOL/L
PROT SERPL-MCNC: 6.6 G/DL
PSA SERPL-MCNC: 2.26 NG/ML
RBC # BLD: 4.25 M/UL
RBC # FLD: 12.8 %
SODIUM SERPL-SCNC: 143 MMOL/L
TIBC SERPL-MCNC: 346 UG/DL
TRIGL SERPL-MCNC: 188 MG/DL
TSH SERPL-ACNC: 4.14 UIU/ML
UIBC SERPL-MCNC: 247 UG/DL
VIT B12 SERPL-MCNC: 558 PG/ML
WBC # FLD AUTO: 5.37 K/UL

## 2025-07-07 ENCOUNTER — OUTPATIENT (OUTPATIENT)
Dept: OUTPATIENT SERVICES | Facility: HOSPITAL | Age: 65
LOS: 1 days | End: 2025-07-07
Payer: MEDICARE

## 2025-07-07 ENCOUNTER — APPOINTMENT (OUTPATIENT)
Dept: ULTRASOUND IMAGING | Facility: CLINIC | Age: 65
End: 2025-07-07
Payer: MEDICARE

## 2025-07-07 ENCOUNTER — APPOINTMENT (OUTPATIENT)
Dept: RADIOLOGY | Facility: CLINIC | Age: 65
End: 2025-07-07
Payer: MEDICARE

## 2025-07-07 DIAGNOSIS — R79.89 OTHER SPECIFIED ABNORMAL FINDINGS OF BLOOD CHEMISTRY: ICD-10-CM

## 2025-07-07 DIAGNOSIS — R10.13 EPIGASTRIC PAIN: ICD-10-CM

## 2025-07-07 PROCEDURE — 71046 X-RAY EXAM CHEST 2 VIEWS: CPT

## 2025-07-07 PROCEDURE — 76700 US EXAM ABDOM COMPLETE: CPT | Mod: 26

## 2025-07-07 PROCEDURE — 71046 X-RAY EXAM CHEST 2 VIEWS: CPT | Mod: 26

## 2025-07-07 PROCEDURE — 76700 US EXAM ABDOM COMPLETE: CPT

## 2025-07-08 ENCOUNTER — LABORATORY RESULT (OUTPATIENT)
Age: 65
End: 2025-07-08

## 2025-07-08 ENCOUNTER — APPOINTMENT (OUTPATIENT)
Dept: INTERNAL MEDICINE | Facility: CLINIC | Age: 65
End: 2025-07-08
Payer: MEDICARE

## 2025-07-08 VITALS
WEIGHT: 194 LBS | SYSTOLIC BLOOD PRESSURE: 110 MMHG | HEIGHT: 67 IN | OXYGEN SATURATION: 97 % | DIASTOLIC BLOOD PRESSURE: 70 MMHG | BODY MASS INDEX: 30.45 KG/M2 | HEART RATE: 75 BPM

## 2025-07-08 PROBLEM — G47.00 INSOMNIA: Status: ACTIVE | Noted: 2025-07-08

## 2025-07-08 PROBLEM — E11.9 TYPE 2 DIABETES MELLITUS: Status: ACTIVE | Noted: 2025-05-26

## 2025-07-08 PROBLEM — Z87.898 HISTORY OF EPIGASTRIC PAIN: Status: RESOLVED | Noted: 2025-07-01 | Resolved: 2025-07-08

## 2025-07-08 PROBLEM — R79.89 ABNORMAL LIVER FUNCTION TEST: Status: ACTIVE | Noted: 2025-07-01

## 2025-07-08 PROBLEM — F32.A DEPRESSION: Status: ACTIVE | Noted: 2025-07-01

## 2025-07-08 PROBLEM — J44.9 COPD (CHRONIC OBSTRUCTIVE PULMONARY DISEASE): Status: ACTIVE | Noted: 2025-07-01

## 2025-07-08 PROBLEM — Z86.69 HISTORY OF OBSTRUCTIVE SLEEP APNEA: Status: RESOLVED | Noted: 2025-07-01 | Resolved: 2025-07-08

## 2025-07-08 PROBLEM — Z71.89 ADVANCE CARE PLANNING: Status: ACTIVE | Noted: 2025-07-08

## 2025-07-08 PROBLEM — Z86.2 HISTORY OF ANEMIA: Status: RESOLVED | Noted: 2025-07-01 | Resolved: 2025-07-08

## 2025-07-08 PROCEDURE — 36415 COLL VENOUS BLD VENIPUNCTURE: CPT

## 2025-07-08 PROCEDURE — 93000 ELECTROCARDIOGRAM COMPLETE: CPT | Mod: 59

## 2025-07-08 PROCEDURE — 99214 OFFICE O/P EST MOD 30 MIN: CPT | Mod: 25

## 2025-07-08 PROCEDURE — G2211 COMPLEX E/M VISIT ADD ON: CPT

## 2025-07-08 PROCEDURE — G0402 INITIAL PREVENTIVE EXAM: CPT

## 2025-07-08 RX ORDER — DEXTROMETHORPHAN HYDROBROMIDE, BUPROPION HYDROCHLORIDE 105; 45 MG/1; MG/1
45-105 TABLET, MULTILAYER, EXTENDED RELEASE ORAL
Qty: 60 | Refills: 0 | Status: ACTIVE | COMMUNITY
Start: 2025-07-08

## 2025-07-08 RX ORDER — ZOLPIDEM TARTRATE 5 MG/1
5 TABLET ORAL
Qty: 30 | Refills: 0 | Status: ACTIVE | COMMUNITY
Start: 2025-07-08

## 2025-07-08 RX ORDER — GABAPENTIN 300 MG/1
300 CAPSULE ORAL 3 TIMES DAILY
Qty: 90 | Refills: 0 | Status: ACTIVE | COMMUNITY
Start: 2025-07-08

## 2025-07-15 ENCOUNTER — NON-APPOINTMENT (OUTPATIENT)
Age: 65
End: 2025-07-15

## 2025-07-15 LAB
A1AT SERPL-MCNC: 149 MG/DL
ALBUMIN MFR SERPL ELPH: 65.9 %
ALBUMIN SERPL ELPH-MCNC: 4.9 G/DL
ALBUMIN SERPL-MCNC: 4.8 G/DL
ALBUMIN/GLOB SERPL: 1.9 RATIO
ALP BLD-CCNC: 55 U/L
ALPHA1 GLOB MFR SERPL ELPH: 4.7 %
ALPHA1 GLOB SERPL ELPH-MCNC: 0.3 G/DL
ALPHA2 GLOB MFR SERPL ELPH: 9 %
ALPHA2 GLOB SERPL ELPH-MCNC: 0.7 G/DL
ALT SERPL-CCNC: 62 U/L
ANION GAP SERPL CALC-SCNC: 16 MMOL/L
AST SERPL-CCNC: 30 U/L
B-GLOBULIN MFR SERPL ELPH: 10.6 %
B-GLOBULIN SERPL ELPH-MCNC: 0.8 G/DL
BILIRUB SERPL-MCNC: 0.6 MG/DL
BUN SERPL-MCNC: 13 MG/DL
CALCIUM SERPL-MCNC: 10 MG/DL
CERULOPLASMIN SERPL-MCNC: 22 MG/DL
CHLORIDE SERPL-SCNC: 103 MMOL/L
CO2 SERPL-SCNC: 21 MMOL/L
CREAT SERPL-MCNC: 0.8 MG/DL
EGFRCR SERPLBLD CKD-EPI 2021: 98 ML/MIN/1.73M2
FERRITIN SERPL-MCNC: 323 NG/ML
GAMMA GLOB FLD ELPH-MCNC: 0.7 G/DL
GAMMA GLOB MFR SERPL ELPH: 9.8 %
GLUCOSE SERPL-MCNC: 112 MG/DL
HAV IGM SER QL: NONREACTIVE
HBV SURFACE AG SER QL: NONREACTIVE
HCV AB SER QL: NONREACTIVE
HCV S/CO RATIO: 0.09 S/CO
INTERPRETATION SERPL IEP-IMP: NORMAL
POTASSIUM SERPL-SCNC: 4.3 MMOL/L
PROT SERPL-MCNC: 7.3 G/DL
SMOOTH MUSCLE AB SER QL IF: NORMAL
SODIUM SERPL-SCNC: 141 MMOL/L

## 2025-07-16 ENCOUNTER — APPOINTMENT (OUTPATIENT)
Dept: INTERNAL MEDICINE | Facility: CLINIC | Age: 65
End: 2025-07-16
Payer: MEDICARE

## 2025-07-16 VITALS
HEART RATE: 77 BPM | HEIGHT: 67 IN | WEIGHT: 195 LBS | TEMPERATURE: 97.6 F | OXYGEN SATURATION: 97 % | BODY MASS INDEX: 30.61 KG/M2 | SYSTOLIC BLOOD PRESSURE: 102 MMHG | DIASTOLIC BLOOD PRESSURE: 58 MMHG

## 2025-07-16 PROBLEM — G47.9 SLEEP DISORDER, UNSPECIFIED: Status: ACTIVE | Noted: 2025-07-08

## 2025-07-16 PROBLEM — E78.5 HYPERLIPIDEMIA, UNSPECIFIED HYPERLIPIDEMIA TYPE: Status: ACTIVE | Noted: 2025-05-26

## 2025-07-16 PROBLEM — I10 HYPERTENSION, UNSPECIFIED TYPE: Status: ACTIVE | Noted: 2025-05-26

## 2025-07-16 PROBLEM — J06.9 UPPER RESPIRATORY INFECTION, ACUTE: Status: ACTIVE | Noted: 2025-07-16 | Resolved: 2025-08-15

## 2025-07-16 PROBLEM — I25.10 CORONARY ARTERY DISEASE: Status: ACTIVE | Noted: 2025-05-26

## 2025-07-16 PROBLEM — R07.0 THROAT PAIN: Status: ACTIVE | Noted: 2025-07-16

## 2025-07-16 PROCEDURE — G2211 COMPLEX E/M VISIT ADD ON: CPT

## 2025-07-16 PROCEDURE — 99214 OFFICE O/P EST MOD 30 MIN: CPT

## 2025-07-16 PROCEDURE — 36415 COLL VENOUS BLD VENIPUNCTURE: CPT

## 2025-07-16 RX ORDER — FLUTICASONE PROPIONATE 50 UG/1
50 SPRAY NASAL
Qty: 1 | Refills: 6 | Status: ACTIVE | COMMUNITY
Start: 2025-07-16 | End: 1900-01-01

## 2025-07-21 ENCOUNTER — NON-APPOINTMENT (OUTPATIENT)
Age: 65
End: 2025-07-21

## 2025-07-21 DIAGNOSIS — R09.81 NASAL CONGESTION: ICD-10-CM

## 2025-07-21 LAB
ALBUMIN SERPL ELPH-MCNC: 4.7 G/DL
ALP BLD-CCNC: 51 U/L
ALT SERPL-CCNC: 58 U/L
ANION GAP SERPL CALC-SCNC: 17 MMOL/L
AST SERPL-CCNC: 36 U/L
BACTERIA THROAT CULT: NORMAL
BILIRUB SERPL-MCNC: 0.5 MG/DL
BUN SERPL-MCNC: 13 MG/DL
CALCIUM SERPL-MCNC: 10.2 MG/DL
CHLORIDE SERPL-SCNC: 102 MMOL/L
CO2 SERPL-SCNC: 19 MMOL/L
CREAT SERPL-MCNC: 0.69 MG/DL
EGFRCR SERPLBLD CKD-EPI 2021: 103 ML/MIN/1.73M2
GLUCOSE SERPL-MCNC: 125 MG/DL
HCT VFR BLD CALC: 43.3 %
HGB BLD-MCNC: 14.4 G/DL
INFLUENZA A RESULT: NOT DETECTED
INFLUENZA B RESULT: NOT DETECTED
MCHC RBC-ENTMCNC: 32.3 PG
MCHC RBC-ENTMCNC: 33.3 G/DL
MCV RBC AUTO: 97.1 FL
PLATELET # BLD AUTO: 210 K/UL
POTASSIUM SERPL-SCNC: 3.7 MMOL/L
PROT SERPL-MCNC: 7.3 G/DL
RBC # BLD: 4.46 M/UL
RBC # FLD: 12.8 %
RESP SYN VIRUS RESULT: NOT DETECTED
SARS-COV-2 RESULT: NOT DETECTED
SODIUM SERPL-SCNC: 138 MMOL/L
WBC # FLD AUTO: 5.58 K/UL

## 2025-07-22 ENCOUNTER — APPOINTMENT (OUTPATIENT)
Dept: SLEEP CENTER | Facility: CLINIC | Age: 65
End: 2025-07-22
Payer: MEDICARE

## 2025-07-22 ENCOUNTER — OUTPATIENT (OUTPATIENT)
Dept: OUTPATIENT SERVICES | Facility: HOSPITAL | Age: 65
LOS: 1 days | End: 2025-07-22
Payer: MEDICARE

## 2025-07-22 PROCEDURE — 95800 SLP STDY UNATTENDED: CPT

## 2025-07-22 PROCEDURE — 95800 SLP STDY UNATTENDED: CPT | Mod: 26

## 2025-08-05 DIAGNOSIS — G47.33 OBSTRUCTIVE SLEEP APNEA (ADULT) (PEDIATRIC): ICD-10-CM

## 2025-08-21 ENCOUNTER — NON-APPOINTMENT (OUTPATIENT)
Age: 65
End: 2025-08-21

## 2025-09-02 ENCOUNTER — APPOINTMENT (OUTPATIENT)
Dept: PULMONOLOGY | Facility: CLINIC | Age: 65
End: 2025-09-02